# Patient Record
Sex: MALE | Race: WHITE | HISPANIC OR LATINO | Employment: FULL TIME | URBAN - METROPOLITAN AREA
[De-identification: names, ages, dates, MRNs, and addresses within clinical notes are randomized per-mention and may not be internally consistent; named-entity substitution may affect disease eponyms.]

---

## 2020-02-07 ENCOUNTER — TRANSFERRED RECORDS (OUTPATIENT)
Dept: HEALTH INFORMATION MANAGEMENT | Facility: CLINIC | Age: 45
End: 2020-02-07

## 2020-03-10 ENCOUNTER — TRANSFERRED RECORDS (OUTPATIENT)
Dept: HEALTH INFORMATION MANAGEMENT | Facility: CLINIC | Age: 45
End: 2020-03-10

## 2020-06-08 ENCOUNTER — TRANSFERRED RECORDS (OUTPATIENT)
Dept: HEALTH INFORMATION MANAGEMENT | Facility: CLINIC | Age: 45
End: 2020-06-08

## 2020-06-08 LAB
ALT SERPL-CCNC: 19 U/L (ref 9–46)
AST SERPL-CCNC: 18 U/L (ref 10–40)

## 2020-06-09 ENCOUNTER — TRANSFERRED RECORDS (OUTPATIENT)
Dept: INFECTIOUS DISEASES | Facility: CLINIC | Age: 45
End: 2020-06-09

## 2021-08-05 ENCOUNTER — APPOINTMENT (OUTPATIENT)
Dept: CT IMAGING | Facility: HOSPITAL | Age: 46
End: 2021-08-05
Attending: EMERGENCY MEDICINE
Payer: MEDICAID

## 2021-08-05 ENCOUNTER — HOSPITAL ENCOUNTER (EMERGENCY)
Facility: HOSPITAL | Age: 46
Discharge: HOME | End: 2021-08-06
Attending: EMERGENCY MEDICINE | Admitting: EMERGENCY MEDICINE
Payer: MEDICAID

## 2021-08-05 VITALS
DIASTOLIC BLOOD PRESSURE: 84 MMHG | TEMPERATURE: 98.4 F | HEIGHT: 66 IN | HEART RATE: 57 BPM | BODY MASS INDEX: 24.66 KG/M2 | RESPIRATION RATE: 16 BRPM | SYSTOLIC BLOOD PRESSURE: 128 MMHG | OXYGEN SATURATION: 100 % | WEIGHT: 153.44 LBS

## 2021-08-05 DIAGNOSIS — R11.2 NAUSEA AND VOMITING, INTRACTABILITY OF VOMITING NOT SPECIFIED, UNSPECIFIED VOMITING TYPE: ICD-10-CM

## 2021-08-05 DIAGNOSIS — R10.84 ABDOMINAL PAIN, GENERALIZED: ICD-10-CM

## 2021-08-05 DIAGNOSIS — R19.7 DIARRHEA, UNSPECIFIED TYPE: ICD-10-CM

## 2021-08-05 LAB
ALBUMIN SERPL-MCNC: 3.7 G/DL (ref 3.5–5)
ALP SERPL-CCNC: 63 U/L (ref 45–120)
ALT SERPL W P-5'-P-CCNC: 21 U/L (ref 0–45)
ANION GAP SERPL CALCULATED.3IONS-SCNC: 9 MMOL/L (ref 5–18)
AST SERPL W P-5'-P-CCNC: 18 U/L (ref 0–40)
BASOPHILS # BLD AUTO: 0 10E3/UL (ref 0–0.2)
BASOPHILS NFR BLD AUTO: 0 %
BILIRUB DIRECT SERPL-MCNC: 0.2 MG/DL
BILIRUB SERPL-MCNC: 0.6 MG/DL (ref 0–1)
BUN SERPL-MCNC: 14 MG/DL (ref 8–22)
CALCIUM SERPL-MCNC: 9 MG/DL (ref 8.5–10.5)
CHLORIDE BLD-SCNC: 108 MMOL/L (ref 98–107)
CO2 SERPL-SCNC: 23 MMOL/L (ref 22–31)
CREAT SERPL-MCNC: 0.89 MG/DL (ref 0.7–1.3)
EOSINOPHIL # BLD AUTO: 0.2 10E3/UL (ref 0–0.7)
EOSINOPHIL NFR BLD AUTO: 2 %
ERYTHROCYTE [DISTWIDTH] IN BLOOD BY AUTOMATED COUNT: 13.5 % (ref 10–15)
GFR SERPL CREATININE-BSD FRML MDRD: >90 ML/MIN/1.73M2
GLUCOSE BLD-MCNC: 93 MG/DL (ref 70–125)
HCT VFR BLD AUTO: 48.7 % (ref 40–53)
HGB BLD-MCNC: 16.1 G/DL (ref 13.3–17.7)
IMM GRANULOCYTES # BLD: 0 10E3/UL
IMM GRANULOCYTES NFR BLD: 0 %
LIPASE SERPL-CCNC: 24 U/L (ref 0–52)
LYMPHOCYTES # BLD AUTO: 2.2 10E3/UL (ref 0.8–5.3)
LYMPHOCYTES NFR BLD AUTO: 23 %
MCH RBC QN AUTO: 29.9 PG (ref 26.5–33)
MCHC RBC AUTO-ENTMCNC: 33.1 G/DL (ref 31.5–36.5)
MCV RBC AUTO: 90 FL (ref 78–100)
MONOCYTES # BLD AUTO: 0.8 10E3/UL (ref 0–1.3)
MONOCYTES NFR BLD AUTO: 9 %
NEUTROPHILS # BLD AUTO: 6.2 10E3/UL (ref 1.6–8.3)
NEUTROPHILS NFR BLD AUTO: 66 %
NRBC # BLD AUTO: 0 10E3/UL
NRBC BLD AUTO-RTO: 0 /100
PLATELET # BLD AUTO: 288 10E3/UL (ref 150–450)
POTASSIUM BLD-SCNC: 3.7 MMOL/L (ref 3.5–5)
PROT SERPL-MCNC: 6.9 G/DL (ref 6–8)
RBC # BLD AUTO: 5.39 10E6/UL (ref 4.4–5.9)
SODIUM SERPL-SCNC: 140 MMOL/L (ref 136–145)
WBC # BLD AUTO: 9.5 10E3/UL (ref 4–11)

## 2021-08-05 PROCEDURE — 258N000003 HC RX IP 258 OP 636: Performed by: EMERGENCY MEDICINE

## 2021-08-05 PROCEDURE — 83690 ASSAY OF LIPASE: CPT | Performed by: EMERGENCY MEDICINE

## 2021-08-05 PROCEDURE — C9803 HOPD COVID-19 SPEC COLLECT: HCPCS

## 2021-08-05 PROCEDURE — 96375 TX/PRO/DX INJ NEW DRUG ADDON: CPT

## 2021-08-05 PROCEDURE — 96374 THER/PROPH/DIAG INJ IV PUSH: CPT

## 2021-08-05 PROCEDURE — 85025 COMPLETE CBC W/AUTO DIFF WBC: CPT | Performed by: EMERGENCY MEDICINE

## 2021-08-05 PROCEDURE — C9113 INJ PANTOPRAZOLE SODIUM, VIA: HCPCS | Performed by: EMERGENCY MEDICINE

## 2021-08-05 PROCEDURE — 250N000009 HC RX 250: Performed by: EMERGENCY MEDICINE

## 2021-08-05 PROCEDURE — 93005 ELECTROCARDIOGRAM TRACING: CPT

## 2021-08-05 PROCEDURE — 99285 EMERGENCY DEPT VISIT HI MDM: CPT | Mod: 25

## 2021-08-05 PROCEDURE — 82248 BILIRUBIN DIRECT: CPT | Performed by: EMERGENCY MEDICINE

## 2021-08-05 PROCEDURE — 74176 CT ABD & PELVIS W/O CONTRAST: CPT

## 2021-08-05 PROCEDURE — 96361 HYDRATE IV INFUSION ADD-ON: CPT

## 2021-08-05 PROCEDURE — 36415 COLL VENOUS BLD VENIPUNCTURE: CPT | Performed by: EMERGENCY MEDICINE

## 2021-08-05 PROCEDURE — 250N000011 HC RX IP 250 OP 636: Performed by: EMERGENCY MEDICINE

## 2021-08-05 RX ORDER — ONDANSETRON 2 MG/ML
4 INJECTION INTRAMUSCULAR; INTRAVENOUS EVERY 30 MIN PRN
Status: DISCONTINUED | OUTPATIENT
Start: 2021-08-05 | End: 2021-08-06 | Stop reason: HOSPADM

## 2021-08-05 RX ORDER — LOPERAMIDE HCL 2 MG
2 CAPSULE ORAL 4 TIMES DAILY PRN
Qty: 28 CAPSULE | Refills: 0 | Status: SHIPPED | OUTPATIENT
Start: 2021-08-05 | End: 2021-08-12

## 2021-08-05 RX ORDER — SODIUM CHLORIDE 9 MG/ML
INJECTION, SOLUTION INTRAVENOUS CONTINUOUS
Status: DISCONTINUED | OUTPATIENT
Start: 2021-08-05 | End: 2021-08-05

## 2021-08-05 RX ORDER — ONDANSETRON 4 MG/1
4 TABLET, ORALLY DISINTEGRATING ORAL EVERY 6 HOURS PRN
Qty: 10 TABLET | Refills: 0 | Status: SHIPPED | OUTPATIENT
Start: 2021-08-05 | End: 2021-08-12

## 2021-08-05 RX ADMIN — PANTOPRAZOLE SODIUM 40 MG: 40 INJECTION, POWDER, FOR SOLUTION INTRAVENOUS at 22:09

## 2021-08-05 RX ADMIN — FAMOTIDINE 20 MG: 10 INJECTION, SOLUTION INTRAVENOUS at 22:09

## 2021-08-05 RX ADMIN — SODIUM CHLORIDE 1000 ML: 9 INJECTION, SOLUTION INTRAVENOUS at 22:14

## 2021-08-05 ASSESSMENT — ENCOUNTER SYMPTOMS
VOMITING: 1
ABDOMINAL PAIN: 1
DYSURIA: 0
HEMATURIA: 0
DIARRHEA: 1

## 2021-08-05 ASSESSMENT — MIFFLIN-ST. JEOR: SCORE: 1513.75

## 2021-08-06 NOTE — ED PROVIDER NOTES
"46-year-old gentleman presenting with abdominal pain nausea vomiting and diarrhea after eating food this evening with no ill contacts otherwise. He is signed out to me by previous provider with normal labs pending CT and likely discharge. CT is noncontrasted due to the patient's single kidney status. CT demonstrates some nonspecific findings suggesting inflammatory or infectious process.    Ultimately on reevaluation patient comfortable in appearance and I believe safe for discharge with continued outpatient follow-up for repeat evaluation possible repeat imaging.    I do not suspect this represents ischemic gut or anything normal shows given the bowel appearance and concurrent nausea vomiting and diarrhea.    At the conclusion of the encounter I discussed the results of all of the tests and the disposition. All questions were answered. The patient and or family acknowledged understanding and was involved in the decision making regarding the overall care plan. I discussed the utility, limitations and findings of the exam/interventions/studies done during this visit as well as the list of differential diagnosis and symptoms for which to monitor/return to ED. Verbalizes understanding.     Vitals:    08/05/21 2004 08/05/21 2255 08/05/21 2300   BP: 125/80 128/84    Pulse: 79 57 57   Resp: 16     Temp: 98.4  F (36.9  C)     TempSrc: Oral     SpO2: 97% 100% 100%   Weight: 69.6 kg (153 lb 7 oz)     Height: 1.676 m (5' 6\")         Results for orders placed or performed during the hospital encounter of 08/05/21   CT Abdomen Pelvis w/o Contrast    Impression    IMPRESSION:   1.  Dilated loops of small bowel in the left midabdomen as detailed above. These extend into the left upper pelvis where there is extension into an area of bowel loops that appear to be slightly thickened allowing for the noncontrast study. This could be   due to inflammation or infection. Another consideration would be thickening related to a localized " area of adhesion. Mild mesenteric edema. No free air. Follow-up evaluation with either small bowel follow-through or follow-up CT to assess for resolution   of this process may be beneficial.    2.  Left nephrectomy.    3.  Evidence for old granulomatous disease.     Basic metabolic panel   Result Value Ref Range    Sodium 140 136 - 145 mmol/L    Potassium 3.7 3.5 - 5.0 mmol/L    Chloride 108 (H) 98 - 107 mmol/L    Carbon Dioxide (CO2) 23 22 - 31 mmol/L    Anion Gap 9 5 - 18 mmol/L    Urea Nitrogen 14 8 - 22 mg/dL    Creatinine 0.89 0.70 - 1.30 mg/dL    Calcium 9.0 8.5 - 10.5 mg/dL    Glucose 93 70 - 125 mg/dL    GFR Estimate >90 >60 mL/min/1.73m2   Hepatic function panel   Result Value Ref Range    Bilirubin Total 0.6 0.0 - 1.0 mg/dL    Bilirubin Direct 0.2 <=0.5 mg/dL    Protein Total 6.9 6.0 - 8.0 g/dL    Albumin 3.7 3.5 - 5.0 g/dL    Alkaline Phosphatase 63 45 - 120 U/L    AST 18 0 - 40 U/L    ALT 21 0 - 45 U/L   Result Value Ref Range    Lipase 24 0 - 52 U/L   CBC with platelets and differential   Result Value Ref Range    WBC Count 9.5 4.0 - 11.0 10e3/uL    RBC Count 5.39 4.40 - 5.90 10e6/uL    Hemoglobin 16.1 13.3 - 17.7 g/dL    Hematocrit 48.7 40.0 - 53.0 %    MCV 90 78 - 100 fL    MCH 29.9 26.5 - 33.0 pg    MCHC 33.1 31.5 - 36.5 g/dL    RDW 13.5 10.0 - 15.0 %    Platelet Count 288 150 - 450 10e3/uL    % Neutrophils 66 %    % Lymphocytes 23 %    % Monocytes 9 %    % Eosinophils 2 %    % Basophils 0 %    % Immature Granulocytes 0 %    NRBCs per 100 WBC 0 <1 /100    Absolute Neutrophils 6.2 1.6 - 8.3 10e3/uL    Absolute Lymphocytes 2.2 0.8 - 5.3 10e3/uL    Absolute Monocytes 0.8 0.0 - 1.3 10e3/uL    Absolute Eosinophils 0.2 0.0 - 0.7 10e3/uL    Absolute Basophils 0.0 0.0 - 0.2 10e3/uL    Absolute Immature Granulocytes 0.0 <=0.0 10e3/uL    Absolute NRBCs 0.0 10e3/uL     CT Abdomen Pelvis w/o Contrast    Result Date: 8/5/2021  EXAM: CT ABDOMEN PELVIS W/O CONTRAST LOCATION: [facility]  DATE/TIME: 8/5/2021 11:03 PM INDICATION: Epigastric abdominal pain. COMPARISON: None. TECHNIQUE: CT scan of the abdomen and pelvis was performed without IV contrast. Multiplanar reformats were obtained. Dose reduction techniques were used. CONTRAST: None. FINDINGS: LOWER CHEST: No infiltrates or effusions. Calcified granuloma left lower lobe. HEPATOBILIARY: Normal. PANCREAS: Normal. SPLEEN: Normal-sized spleen. Splenic granulomas. ADRENAL GLANDS: Mild adrenal thickening without discrete nodularity. KIDNEYS/BLADDER: Left nephrectomy. No urinary collecting system dilatation or calculi. Bladder unremarkable. BOWEL: Dilated loops of small bowel throughout the left upper quadrant extending into the left lower quadrant and upper left pelvis where there is transition to what appears to be a thickened area of bowel loops in the left lower quadrant (series 3, image 49). Findings may be due to developing small bowel obstruction related to an inflammatory or infectious etiology versus bowel wall thickening associated with localized adhesion. Mild mesenteric edema. Distal small bowel contains a minimal amount of  gas and fluid. No free air. Appendix unremarkable. Large amount of stool in the colon. LYMPH NODES: No lymphadenopathy. VASCULATURE: Unremarkable. PELVIC ORGANS: Unremarkable. No free fluid. MUSCULOSKELETAL: No significant osseous abnormality.     IMPRESSION: 1.  Dilated loops of small bowel in the left midabdomen as detailed above. These extend into the left upper pelvis where there is extension into an area of bowel loops that appear to be slightly thickened allowing for the noncontrast study. This could be  due to inflammation or infection. Another consideration would be thickening related to a localized area of adhesion. Mild mesenteric edema. No free air. Follow-up evaluation with either small bowel follow-through or follow-up CT to assess for resolution  of this process may be beneficial. 2.  Left nephrectomy. 3.   Evidence for old granulomatous disease.       ICD-10-CM    1. Abdominal pain, generalized  R10.84    2. Nausea and vomiting, intractability of vomiting not specified, unspecified vomiting type  R11.2    3. Diarrhea, unspecified type  R19.7           Marco Antonio Galvan MD  08/05/21 9104

## 2021-08-06 NOTE — ED TRIAGE NOTES
"Pt states \"started having abdominal pain, nausea, vomitting and diarrhea\" after eating. Pt also concerned about having one kidney.  "

## 2021-08-06 NOTE — ED PROVIDER NOTES
EMERGENCY DEPARTMENT ENCOUNTER      NAME: Ramon Quinones  AGE: 46 year old male  YOB: 1975  MRN: [id number]  EVALUATION DATE & TIME: 8/5/2021  9:31 PM    PCP: No primary care provider on file.    ED PROVIDER: Antoine Whitt M.D.      Chief Complaint   Patient presents with     Nausea, Vomiting, & Diarrhea     Abdominal Pain         FINAL IMPRESSION:  1. Abdominal pain, generalized    2. Nausea and vomiting, intractability of vomiting not specified, unspecified vomiting type    3. Diarrhea, unspecified type          ED COURSE & MEDICAL DECISION MAKING:    Pertinent Labs & Imaging studies reviewed. (See chart for details)  46 year old male presents to the Emergency Department for evaluation of diarrhea followed by nausea vomiting and abdominal pain.  Lab assays reassuring.  Physical exam with diffuse tenderness to palpation without peritoneal signs.  No signs of GI bleed.    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  Patient presents with complaint of diarrhea since last night.  He subsequently developed nausea and vomiting with p.o. intolerance.  He also has some diffuse abdominal pain.  Patient reports his symptoms began after eating lentils.  However, no one else who ate with him is ill.  He reports some chills but is not had fever.  He took 1 dose of Tylenol for symptoms last night.      2224 Lipase is negative.  LFTs are unremarkable.      2225 Patient has no respiratory symptoms at this time      2225 Abdominal exam without peritoneal findings but there is diffuse tenderness to palpation.  Obtain CT of abdomen without contrast given history of nephrectomy.      2240 CBC is unremarkable           9:38 PM I met with patient for initial interview and encounter. PPE worn includes N95 mask and goggles.  10:40 PM care transition to Dr. Galvan pending results of CT scan of abdomen  At the conclusion of the encounter I  discussed the results of all of the tests and the disposition. The questions were answered. The patient or family acknowledged understanding and was agreeable with the care plan.        minutes of critical care time     MEDICATIONS GIVEN IN THE EMERGENCY:  Medications   0.9% sodium chloride BOLUS (0 mLs Intravenous Stopped 8/5/21 2356)   famotidine (PEPCID) injection 20 mg (20 mg Intravenous Given 8/5/21 2209)   pantoprazole (PROTONIX) IV push injection 40 mg (40 mg Intravenous Given 8/5/21 2209)       NEW PRESCRIPTIONS STARTED AT TODAY'S ER VISIT  Discharge Medication List as of 8/6/2021 12:01 AM      START taking these medications    Details   loperamide (IMODIUM) 2 MG capsule Take 1 capsule (2 mg) by mouth 4 times daily as needed for diarrhea, Disp-28 capsule, R-0, Local Print      ondansetron (ZOFRAN ODT) 4 MG ODT tab Take 1 tablet (4 mg) by mouth every 6 hours as needed for nausea, Disp-10 tablet, R-0, Local Print                =================================================================    HPI    Patient information was obtained from: Patient     Use of Intrepreter: N/A        Ramon Quinones is a 46 year old male with a pertinent history of s/p nephrectomy (left), who presents to this ED via walk-in for evaluation of nausea, vomiting, diarrhea.    Patient reports onset of persistent diarrhea and diffuse abdominal pain around 12:00 AM last night. He took a does of tylenol with some relief. This morning he had additional onset of subjective fever and vomiting, and states he has not been able to keep down any food or liquids all day.     Patient notes that he ate lentils for dinner last night. States that no one else who ate them is sick.    Patient denies any urinary symptoms, or any other complaints.      REVIEW OF SYSTEMS   Review of Systems   Gastrointestinal: Positive for abdominal pain, diarrhea and vomiting.   Genitourinary: Negative for dysuria and hematuria.   All other systems reviewed and  are negative.       PAST MEDICAL HISTORY:  History reviewed. No pertinent past medical history.    PAST SURGICAL HISTORY:  History reviewed. No pertinent surgical history.        CURRENT MEDICATIONS:    loperamide (IMODIUM) 2 MG capsule  ondansetron (ZOFRAN ODT) 4 MG ODT tab        ALLERGIES:  No Known Allergies    FAMILY HISTORY:  History reviewed. No pertinent family history.    SOCIAL HISTORY:   Social History     Socioeconomic History     Marital status: None     Spouse name: None     Number of children: None     Years of education: None     Highest education level: None   Occupational History     None   Tobacco Use     Smoking status: None   Substance and Sexual Activity     Alcohol use: None     Drug use: None     Sexual activity: None   Other Topics Concern     None   Social History Narrative     None     Social Determinants of Health     Financial Resource Strain:      Difficulty of Paying Living Expenses:    Food Insecurity:      Worried About Running Out of Food in the Last Year:      Ran Out of Food in the Last Year:    Transportation Needs:      Lack of Transportation (Medical):      Lack of Transportation (Non-Medical):    Physical Activity:      Days of Exercise per Week:      Minutes of Exercise per Session:    Stress:      Feeling of Stress :    Social Connections:      Frequency of Communication with Friends and Family:      Frequency of Social Gatherings with Friends and Family:      Attends [employer] Services:      Active Member of Clubs or Organizations:      Attends Club or Organization Meetings:      Marital Status:    Intimate Partner Violence:      Fear of Current or Ex-Partner:      Emotionally Abused:      Physically Abused:      Sexually Abused:        VITALS:  Patient Vitals for the past 24 hrs:   BP Temp Temp src Pulse Resp SpO2 Height Weight   08/05/21 2300 -- -- -- 57 -- 100 % -- --   08/05/21 2255 128/84 -- -- 57 -- 100 % -- --   08/05/21 2004 125/80 98.4  F (36.9  C) Oral 79 16 97 %  "1.676 m (5' 6\") 69.6 kg (153 lb 7 oz)       PHYSICAL EXAM    General: A&O x 3 no apparent distress  HEENT: PERRL, EOMI, moist mucous membranes  Neck: Supple, no rigidity  Cardiovascular: 2+ distal pulses, cap refill less than 2 seconds. RRR No m/r/g  Pulmonary: Chest nontender, symmetrical rise, normal effort, no respiratory distress. Clear to auscultation bilaterally.  Abdomen: Diffuse abdominal tenderness to palpation. No distention. No rebound, guarding, or rigidity.  Extremities: No clubbing, cyanosis, or edema  Musculoskeletal: Gait normal; extremities atraumatic x4  Neuro: Cranial nerves II through XII intact, GCS 15; intact, symmetric strength and sensation x4 extremities  Skin: No rash, jaundice, pallor.  Warm dry and intact. Well healed left nephrectomy scar.  Psych: Normal mood and affect         LAB:  All pertinent labs reviewed and interpreted.  Results for orders placed or performed during the hospital encounter of 08/05/21   CT Abdomen Pelvis w/o Contrast    Impression    IMPRESSION:   1.  Dilated loops of small bowel in the left midabdomen as detailed above. These extend into the left upper pelvis where there is extension into an area of bowel loops that appear to be slightly thickened allowing for the noncontrast study. This could be   due to inflammation or infection. Another consideration would be thickening related to a localized area of adhesion. Mild mesenteric edema. No free air. Follow-up evaluation with either small bowel follow-through or follow-up CT to assess for resolution   of this process may be beneficial.    2.  Left nephrectomy.    3.  Evidence for old granulomatous disease.     Basic metabolic panel   Result Value Ref Range    Sodium 140 136 - 145 mmol/L    Potassium 3.7 3.5 - 5.0 mmol/L    Chloride 108 (H) 98 - 107 mmol/L    Carbon Dioxide (CO2) 23 22 - 31 mmol/L    Anion Gap 9 5 - 18 mmol/L    Urea Nitrogen 14 8 - 22 mg/dL    Creatinine 0.89 0.70 - 1.30 mg/dL    Calcium 9.0 8.5 - " 10.5 mg/dL    Glucose 93 70 - 125 mg/dL    GFR Estimate >90 >60 mL/min/1.73m2   Hepatic function panel   Result Value Ref Range    Bilirubin Total 0.6 0.0 - 1.0 mg/dL    Bilirubin Direct 0.2 <=0.5 mg/dL    Protein Total 6.9 6.0 - 8.0 g/dL    Albumin 3.7 3.5 - 5.0 g/dL    Alkaline Phosphatase 63 45 - 120 U/L    AST 18 0 - 40 U/L    ALT 21 0 - 45 U/L   Result Value Ref Range    Lipase 24 0 - 52 U/L   CBC with platelets and differential   Result Value Ref Range    WBC Count 9.5 4.0 - 11.0 10e3/uL    RBC Count 5.39 4.40 - 5.90 10e6/uL    Hemoglobin 16.1 13.3 - 17.7 g/dL    Hematocrit 48.7 40.0 - 53.0 %    MCV 90 78 - 100 fL    MCH 29.9 26.5 - 33.0 pg    MCHC 33.1 31.5 - 36.5 g/dL    RDW 13.5 10.0 - 15.0 %    Platelet Count 288 150 - 450 10e3/uL    % Neutrophils 66 %    % Lymphocytes 23 %    % Monocytes 9 %    % Eosinophils 2 %    % Basophils 0 %    % Immature Granulocytes 0 %    NRBCs per 100 WBC 0 <1 /100    Absolute Neutrophils 6.2 1.6 - 8.3 10e3/uL    Absolute Lymphocytes 2.2 0.8 - 5.3 10e3/uL    Absolute Monocytes 0.8 0.0 - 1.3 10e3/uL    Absolute Eosinophils 0.2 0.0 - 0.7 10e3/uL    Absolute Basophils 0.0 0.0 - 0.2 10e3/uL    Absolute Immature Granulocytes 0.0 <=0.0 10e3/uL    Absolute NRBCs 0.0 10e3/uL       RADIOLOGY:  Reviewed all pertinent imaging. Please see official radiology report.  CT Abdomen Pelvis w/o Contrast   Final Result   IMPRESSION:    1.  Dilated loops of small bowel in the left midabdomen as detailed above. These extend into the left upper pelvis where there is extension into an area of bowel loops that appear to be slightly thickened allowing for the noncontrast study. This could be    due to inflammation or infection. Another consideration would be thickening related to a localized area of adhesion. Mild mesenteric edema. No free air. Follow-up evaluation with either small bowel follow-through or follow-up CT to assess for resolution    of this process may be beneficial.      2.  Left  nephrectomy.      3.  Evidence for old granulomatous disease.               PROCEDURES:   None      I, Mart Hale, am serving as a scribe to document services personally performed by Dr. Whitt based on my observation and the provider's statements to me. I, Antoine Whitt MD attest that Mart Hale is acting in a scribe capacity, has observed my performance of the services and has documented them in accordance with my direction.  Any spelling or grammatic inconsistencies or inaccuracies are typographical or dictation errors.    Antoine Whitt M.D.  Emergency Medicine  [facility] EMERGENCY DEPARTMENT  [facility] [facility] [address]  [phone]  Dept: [phone]     Antoine Whitt MD  08/06/21 4837

## 2021-08-06 NOTE — DISCHARGE INSTRUCTIONS
1.  Follow-up with your primary care on Monday    2.  If you experience worsening abdominal pain, fever, bloody diarrhea or any new or concerning symptoms, return to the emergency department    3.  Take medication as instructed.

## 2022-07-07 ENCOUNTER — TRANSFERRED RECORDS (OUTPATIENT)
Dept: HEALTH INFORMATION MANAGEMENT | Facility: CLINIC | Age: 47
End: 2022-07-07

## 2022-07-19 ENCOUNTER — MEDICAL CORRESPONDENCE (OUTPATIENT)
Dept: HEALTH INFORMATION MANAGEMENT | Facility: CLINIC | Age: 47
End: 2022-07-19

## 2022-07-23 ENCOUNTER — TRANSCRIBE ORDERS (OUTPATIENT)
Dept: OTHER | Age: 47
End: 2022-07-23

## 2022-07-23 DIAGNOSIS — H35.722 RETINAL PIGMENT EPITHELIAL DETACHMENT OF LEFT EYE: Primary | ICD-10-CM

## 2022-08-10 DIAGNOSIS — H43.393 VITREOUS SYNERESIS OF BOTH EYES: Primary | ICD-10-CM

## 2022-08-30 ENCOUNTER — TELEPHONE (OUTPATIENT)
Dept: OPHTHALMOLOGY | Facility: CLINIC | Age: 47
End: 2022-08-30

## 2022-08-30 NOTE — TELEPHONE ENCOUNTER
[phone]    Spoke to pt at 0915 with     Pt did not receive appt details prior to visit last week and was not able to make appt    Scheduled September 8th with Dr. Tavera    Pt aware of date/time/location/duration and main clinic number    James Rae RN 9:43 AM 08/31/22            [facility] Call Center    Phone Message    May a detailed message be left on voicemail: yes     Reason for Call: Appointment Intake    Referring Provider Name: Referred by: Dr. 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

## 2022-09-08 ENCOUNTER — OFFICE VISIT (OUTPATIENT)
Dept: OPHTHALMOLOGY | Facility: CLINIC | Age: 47
End: 2022-09-08
Attending: OPHTHALMOLOGY
Payer: MEDICAID

## 2022-09-08 ENCOUNTER — LAB (OUTPATIENT)
Dept: LAB | Facility: CLINIC | Age: 47
End: 2022-09-08
Payer: MEDICAID

## 2022-09-08 DIAGNOSIS — H35.712 CENTRAL SEROUS CHORIORETINOPATHY OF LEFT EYE: Primary | ICD-10-CM

## 2022-09-08 DIAGNOSIS — H35.063 RETINAL VASCULITIS OF BOTH EYES: ICD-10-CM

## 2022-09-08 DIAGNOSIS — H43.393 VITREOUS SYNERESIS OF BOTH EYES: ICD-10-CM

## 2022-09-08 PROCEDURE — 99207 FUNDUS PHOTOS OU (BOTH EYES): CPT | Mod: 26 | Performed by: STUDENT IN AN ORGANIZED HEALTH CARE EDUCATION/TRAINING PROGRAM

## 2022-09-08 PROCEDURE — 86225 DNA ANTIBODY NATIVE: CPT | Performed by: STUDENT IN AN ORGANIZED HEALTH CARE EDUCATION/TRAINING PROGRAM

## 2022-09-08 PROCEDURE — 86038 ANTINUCLEAR ANTIBODIES: CPT | Performed by: STUDENT IN AN ORGANIZED HEALTH CARE EDUCATION/TRAINING PROGRAM

## 2022-09-08 PROCEDURE — 99204 OFFICE O/P NEW MOD 45 MIN: CPT | Mod: GC | Performed by: STUDENT IN AN ORGANIZED HEALTH CARE EDUCATION/TRAINING PROGRAM

## 2022-09-08 PROCEDURE — 86160 COMPLEMENT ANTIGEN: CPT | Performed by: STUDENT IN AN ORGANIZED HEALTH CARE EDUCATION/TRAINING PROGRAM

## 2022-09-08 PROCEDURE — 99000 SPECIMEN HANDLING OFFICE-LAB: CPT | Performed by: PATHOLOGY

## 2022-09-08 PROCEDURE — 85549 MURAMIDASE: CPT | Mod: 90 | Performed by: PATHOLOGY

## 2022-09-08 PROCEDURE — 36415 COLL VENOUS BLD VENIPUNCTURE: CPT | Performed by: PATHOLOGY

## 2022-09-08 PROCEDURE — 82164 ANGIOTENSIN I ENZYME TEST: CPT | Mod: 90 | Performed by: PATHOLOGY

## 2022-09-08 PROCEDURE — 86618 LYME DISEASE ANTIBODY: CPT | Performed by: STUDENT IN AN ORGANIZED HEALTH CARE EDUCATION/TRAINING PROGRAM

## 2022-09-08 PROCEDURE — 92134 CPTRZ OPH DX IMG PST SGM RTA: CPT | Mod: 26 | Performed by: STUDENT IN AN ORGANIZED HEALTH CARE EDUCATION/TRAINING PROGRAM

## 2022-09-08 PROCEDURE — 92134 CPTRZ OPH DX IMG PST SGM RTA: CPT | Performed by: OPHTHALMOLOGY

## 2022-09-08 PROCEDURE — 92250 FUNDUS PHOTOGRAPHY W/I&R: CPT | Performed by: OPHTHALMOLOGY

## 2022-09-08 PROCEDURE — 86036 ANCA SCREEN EACH ANTIBODY: CPT | Performed by: STUDENT IN AN ORGANIZED HEALTH CARE EDUCATION/TRAINING PROGRAM

## 2022-09-08 PROCEDURE — G0463 HOSPITAL OUTPT CLINIC VISIT: HCPCS | Mod: 25

## 2022-09-08 PROCEDURE — 86481 TB AG RESPONSE T-CELL SUSP: CPT | Performed by: STUDENT IN AN ORGANIZED HEALTH CARE EDUCATION/TRAINING PROGRAM

## 2022-09-08 PROCEDURE — 92235 FLUORESCEIN ANGRPH MLTIFRAME: CPT | Performed by: OPHTHALMOLOGY

## 2022-09-08 PROCEDURE — 92235 FLUORESCEIN ANGRPH MLTIFRAME: CPT | Mod: 26 | Performed by: STUDENT IN AN ORGANIZED HEALTH CARE EDUCATION/TRAINING PROGRAM

## 2022-09-08 PROCEDURE — 86780 TREPONEMA PALLIDUM: CPT | Performed by: STUDENT IN AN ORGANIZED HEALTH CARE EDUCATION/TRAINING PROGRAM

## 2022-09-08 RX ORDER — ESCITALOPRAM OXALATE 10 MG/1
10 TABLET ORAL
COMMUNITY

## 2022-09-08 RX ORDER — IBUPROFEN 200 MG
400 TABLET ORAL
COMMUNITY
Start: 2022-08-05

## 2022-09-08 RX ORDER — HYDROCHLOROTHIAZIDE 25 MG/1
12.5 TABLET ORAL
COMMUNITY

## 2022-09-08 RX ORDER — ACETAMINOPHEN 325 MG/1
650 TABLET ORAL
COMMUNITY
Start: 2022-08-05

## 2022-09-08 ASSESSMENT — TONOMETRY
OD_IOP_MMHG: 14
OS_IOP_MMHG: 15
IOP_METHOD: TONOPEN

## 2022-09-08 ASSESSMENT — CONF VISUAL FIELD
OS_NORMAL: 1
OD_NORMAL: 1
METHOD: COUNTING FINGERS

## 2022-09-08 ASSESSMENT — VISUAL ACUITY
OS_CC: 20/20
OD_CC: 20/20
OS_CC+: -2
CORRECTION_TYPE: GLASSES
METHOD: SNELLEN - LINEAR

## 2022-09-08 ASSESSMENT — CUP TO DISC RATIO
OD_RATIO: 0.2
OS_RATIO: 0.3

## 2022-09-08 ASSESSMENT — REFRACTION_WEARINGRX
SPECS_TYPE: BIFOCAL
OS_CYLINDER: +0.75
OD_CYLINDER: +0.50
OS_ADD: +1.50
OS_SPHERE: -0.25
OD_SPHERE: -0.25
OD_AXIS: 006
OS_AXIS: 011
OD_ADD: +1.50

## 2022-09-08 ASSESSMENT — EXTERNAL EXAM - LEFT EYE: OS_EXAM: NORMAL

## 2022-09-08 ASSESSMENT — SLIT LAMP EXAM - LIDS
COMMENTS: NORMAL
COMMENTS: NORMAL

## 2022-09-08 ASSESSMENT — EXTERNAL EXAM - RIGHT EYE: OD_EXAM: NORMAL
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
## 2022-09-08 NOTE — NURSING NOTE
Chief Complaints and History of Present Illnesses   Patient presents with     Retinal Evaluation     Chief Complaint(s) and History of Present Illness(es)     Retinal Evaluation     Laterality: left eye    Course: stable    Associated symptoms: headache.  Negative for eye pain, flashes and floaters              Comments     48yo male here for RPE detachment left eye. Vision is doing well - he notes an circular object in vision when he blinks. There is occasional HA. No flashes and floaters. No gtts.    Mingo Stallworth COT 11:38 AM September 8, 2022

## 2022-09-08 NOTE — PATIENT INSTRUCTIONS
Por favor, shira pruebas de blanca a la [facility]: [address]; [place], [place]     Regrese con la especialista en inflammacion oculares (Dr Chappell o Dra Hernandez) y Dra Tavera in 8 semanas

## 2022-09-08 NOTE — PROGRESS NOTES
CC: First appointment with me. Referred by outside optometrist [facility]. Patient doesn't remember name.     HPI: Ramon Quinones is a pleasant 47 year old male patient with unremarkable PMHx and POHx who developed a blurred spot and distortion in his left eye sometime in 03/2022. He went to [facility] as he thought he needed new glasses, and they took a fundus photo showing him an abnormality in his left eye.  He thinks the blurred spot and distortion have stayed the same since 03/2022.     He has been stressed over the last 2 weeks with relationship problems, but denies any unusual life stressors.  Had cholesteatoma left ear resected by ENT 08/05/2022.   He donated one of his kidneys.     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 in late phase, peripheral temporal vascular leakage along one branch of IT arteriole and mild peripheral capillary dropout; few microaneurysms   LE: arteriole phase hyperF at the 2 macular PED lesions - 2x lesions exhibit late hyperfluorescent pooling; no late staining or leakage of the disc, in late phase, peripheral temporal vascular leakage along one branch of IT arteriole and  mild peripheral capillary dropout;  few microaneurysms     Assessment & Plan:    # Retinal vasculitis, mild inflammatory stage, both eyes  - Incidental finding with subtle IRHs on clinical exam, but confirmed with FA  - Mild vascular leakage temporally on FA in the IT arteriolar distributions correlating with IRH on clinical exam. No macular involvement. No NV.    - ROS negative besides left hearing loss after choleasteatoma resection and paracentral scotoma left eye due to CSCR  - DDx: infectious, inflammatory conditions like syphillis, sarcoid or SLE;   - ROS is not consistent with GPA, MPA or PAN, with good kidney function doubt anti-GBM.   - Will workup with YVAN, anti-dsDNA, ANCA, ACE, lysozyme, QuantGold, syphillis, C3, C4, Lyme  - Will refer to Uveitis for further monitoring and management     # CSCR, left eye  - Onset by history 03/2022 and has not improved by subjective reports  - Etiology: Viagra?, possible YOVANI?, not a hyperope, only had recent stressor but he was doing well back in 03/2022 w/o any life stressors  - unclear if related to peripheral retinal vasculitis  - denies ever using corticosteroid-containing medications   - VA 20/20 with only mild symptoms  - Will observe and repeat imaging in 8 weeks    # Mild HTN retinopathy   - Mild AV crossing changes in both eyes  - Denies DM  - BP in clinic 139/93   - Emphasized good BP control with [facility]/PCP team     # K scar, left eye  - Likely old metallic FB, but pt doesn't remember  - Observe      RTC: Return in about 4 weeks (around 10/6/2022) for Dr Chappell or David with labs, DFE and OCT mac.   Retina Dr Tavera in 8 weeks         ~~~~~~~~~~~~~~~~~~~~~~~~~~~~~~~~~~  My privilege to be part of your care,  Adonis Gil MD, MSc  Ophthalmology PGY-3 resident physician  Pager: [phone]     Complete documentation of historical and exam elements from today's encounter can be found in the full encounter summary report (not reduplicated in this progress note).  I personally obtained the chief complaint(s) and history of present illness.  I confirmed and edited as necessary the review of systems, past medical/surgical history, family history, social history, and examination findings as documented by others; and I examined the patient myself.  I personally reviewed the relevant tests, images, and reports as  documented above.  I personally reviewed the ophthalmic test(s) associated with this encounter, agree with the interpretation(s) as documented by the resident/fellow, and have edited the corresponding report(s) as necessary.   I formulated and edited as necessary the assessment and plan and discussed the findings and management plan with the patient and family      Estrellita Tavera MD  Professor of Ophthalmology.   Vitreo-retinal surgeon   Department of Ophthalmology and Visual Neurosciences   [facility]  Phone: [phone]   Fax: [phone]

## 2022-09-09 LAB
ACE SERPL-CCNC: 25 U/L
ANA SER QL IF: NEGATIVE
ANCA AB PATTERN SER IF-IMP: NORMAL
B BURGDOR IGG+IGM SER QL: 0.08
C-ANCA TITR SER IF: NORMAL {TITER}
C3 SERPL-MCNC: 88 MG/DL (ref 81–157)
C4 SERPL-MCNC: 19 MG/DL (ref 13–39)
DSDNA AB SER-ACNC: <0.6 IU/ML
T PALLIDUM AB SER QL: NONREACTIVE

## 2022-09-10 LAB
GAMMA INTERFERON BACKGROUND BLD IA-ACNC: 0.21 IU/ML
M TB IFN-G BLD-IMP: POSITIVE
M TB IFN-G CD4+ BCKGRND COR BLD-ACNC: 9.79 IU/ML
MITOGEN IGNF BCKGRD COR BLD-ACNC: 0.68 IU/ML
MITOGEN IGNF BCKGRD COR BLD-ACNC: 1.12 IU/ML
QUANTIFERON MITOGEN: 10 IU/ML
QUANTIFERON NIL TUBE: 0.21 IU/ML
QUANTIFERON TB1 TUBE: 0.89 IU/ML
QUANTIFERON TB2 TUBE: 1.33

## 2022-09-11 ENCOUNTER — TELEPHONE (OUTPATIENT)
Dept: OPHTHALMOLOGY | Facility: CLINIC | Age: 47
End: 2022-09-11

## 2022-09-11 DIAGNOSIS — H35.063 RETINAL VASCULITIS OF BOTH EYES: Primary | ICD-10-CM

## 2022-09-11 DIAGNOSIS — Z22.7 LATENT TUBERCULOSIS: ICD-10-CM

## 2022-09-11 NOTE — TELEPHONE ENCOUNTER
Reached out to patient informing him about Quantiferon Tb. I placed referral for infectious disease to consider treatment of latent Tb.

## 2022-09-12 LAB — LYSOZYME SERPL-MCNC: 2.25 UG/ML

## 2022-09-13 ENCOUNTER — TELEPHONE (OUTPATIENT)
Dept: INFECTIOUS DISEASES | Facility: CLINIC | Age: 47
End: 2022-09-13

## 2022-09-13 ENCOUNTER — PATIENT OUTREACH (OUTPATIENT)
Dept: CARE COORDINATION | Facility: CLINIC | Age: 47
End: 2022-09-13

## 2022-09-13 NOTE — TELEPHONE ENCOUNTER
----- Message from Madeleine Johnston RN sent at 9/13/2022 11:58 AM CDT -----  Hi!    We have an opening tomorrow (Sarah) and an opening Thursday (Ruthie). Would you mind calling patient and seeing if he wants a sooner appointment?    Thanks!!  Karo

## 2022-09-14 NOTE — TELEPHONE ENCOUNTER
Patient calling back and wants to speak to a nurse regarding an appointment.  Please advise. Thank you.
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
## 2022-09-14 NOTE — TELEPHONE ENCOUNTER
Patient returning call to schedule sooner appointment on 9/15/2022  needed more info to set appointment. What time? Please follow up . Thank you

## 2022-09-22 ENCOUNTER — OFFICE VISIT (OUTPATIENT)
Dept: CT IMAGING | Facility: CLINIC | Age: 47
End: 2022-09-22
Attending: STUDENT IN AN ORGANIZED HEALTH CARE EDUCATION/TRAINING PROGRAM
Payer: MEDICAID

## 2022-09-22 VITALS
BODY MASS INDEX: 24.86 KG/M2 | OXYGEN SATURATION: 98 % | WEIGHT: 154 LBS | DIASTOLIC BLOOD PRESSURE: 110 MMHG | HEART RATE: 64 BPM | SYSTOLIC BLOOD PRESSURE: 148 MMHG | TEMPERATURE: 97.6 F

## 2022-09-22 DIAGNOSIS — R76.12 POSITIVE QUANTIFERON-TB GOLD TEST: ICD-10-CM

## 2022-09-22 PROCEDURE — G0463 HOSPITAL OUTPT CLINIC VISIT: HCPCS

## 2022-09-22 PROCEDURE — 99205 OFFICE O/P NEW HI 60 MIN: CPT | Performed by: STUDENT IN AN ORGANIZED HEALTH CARE EDUCATION/TRAINING PROGRAM

## 2022-09-22 ASSESSMENT — PAIN SCALES - GENERAL: PAINLEVEL: NO PAIN (0)

## 2022-09-22 NOTE — PROGRESS NOTES
[facility]  General Infectious Disease Clinic Note:  New Patient     Patient:  Ramon Chaidez, Date of birth 1975   Medical record number [id number]  Date of Visit:  09/22/2022  Consult requested by Ophthalmology for evaluation of positive Quantiferon test.            Problem List:  1. Positive Quantiferon test     Assessment:  Mr. 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     - During today's encounter, patient informed the writer that he was treated for LTBI by his Primary care doctor just before or at the beginning of COVID-19 pandemic. He does not recall details but agreeing to sign release of records to verify this.     - His c/o vision changes started 6 months ago. If patient was treated adequately for LTBI in past, then it likely is not a attributing factor for his current retinal vasculitis. Work up for other etiology such as granulomatous disease will be useful. Prior CT in Aug, '21 mentioned about changes consistent with old granulomatous disease.     - Although if in case, not able to verify prior LTBI treatment then it might be challenging, since need to rule out active Tb disease before starting treatment for LTBI. In case of involvement of eye, which can presents as panuveitis and difficult to diagnose active Tb due to paucity of organism and may need to treat as Tb meningitis. As of now, will hold of any such evaluation and will attempt to verify prior LTBI treatment records.     Recommendations:  1. Plan to obtain records from primary care clinic and verify prior treatment for LTBI. In that case, patient will not need treatment for positive Quantiferon test that was resulted on 9/8/22. Of note, Quantiferon test can remain positive for life, even after successful  treatment for LTBI.       60 minutes spent on the date of the encounter doing chart review, history and exam, documentation and further activities per the note.     Chelita Avelar MD    Infectious Diseases  Pager: [phone]         History of the Infectious Disease lllness:     Mr. JANIYA CHAIDEZ is a 48 yo Puerto Rican-speaking male, accompanied by his wife for today's visit.  service was used for this encounter.     Patient is referred to ID clinic by Ophthalmology regarding positive Quantiferon test resulted on 9/8/22. Patient started to have vision changes started at least 6 months ago in March, '22. He was seen by Ophthalmology clinic, and noted to have b/l mild retinal vasculitis which can be either infectious or inflammatory in etiology. As a work up, found to have positive Quantiferon and concern for LTBI which likely causing eye symptoms. During interview, patient recalls taking a pill for few months when his urine turned red. This was just before or around the beginning of COVID-19 pandemic. Does not recall details but states that his primary care doctor can provide the records. Denies fever, chills, night sweats, weight loss, cough, short of breath. No new changes in his vision and will be following up with his eye doctor as scheduled. He was tested negative for HIV per patient, ~5 years when tested at some clinic site in [facility] (at [address]       Review of Systems:  CONSTITUTIONAL:  No fevers or chills. No night sweats.  EYES: negative for icterus or acute vision changes.   ENT:  negative for hearing loss, tinnitus or sore throat  RESPIRATORY:  negative for cough, sputum, dyspnea  CARDIOVASCULAR:  negative for chest pain, heart palpitations  GASTROINTESTINAL:  negative for nausea, vomiting, diarrhea or constipation  GENITOURINARY:  negative for dysuria or hematuria.  HEME:  No easy bruising or bleeding  INTEGUMENT:  negative for rash or pruritus  NEURO:  Negative for  headache or tremor.    No past medical history on file.    Past Surgical History:   Procedure Laterality Date     INNER EAR SURGERY Left      KIDNEY SURGERY         Family History   Problem Relation Age of Onset     No Known Problems Mother      No Known Problems Father      No Known Problems Maternal Grandmother      No Known Problems Maternal Grandfather      No Known Problems Paternal Grandmother      No Known Problems Paternal Grandfather      No Known Problems Brother      No Known Problems Sister      No Known Problems Son      No Known Problems Daughter      No Known Problems Other      No Known Problems Other        Social History     Social History Narrative     Not on file     Social History     Tobacco Use     Smoking status: Current Every Day Smoker     Types: Cigarettes     Smokeless tobacco: Never Used         There is no immunization history on file for this patient.    There is no problem list on file for this patient.      Outpatient Medications Marked as Taking for the 9/22/22 encounter (Office Visit) with Chelita Avelar MD   Medication Sig     acetaminophen (TYLENOL) 325 MG tablet Take 650 mg by mouth     escitalopram (LEXAPRO) 10 MG tablet Take 10 mg by mouth     hydrochlorothiazide (HYDRODIURIL) 25 MG tablet Take 12.5 mg by mouth     ibuprofen (ADVIL/MOTRIN) 200 MG tablet Take 400 mg by mouth       No Known Allergies           Physical Exam:   Vitals were reviewed.  All vitals stable  BP (!) 148/110   Pulse 64   Temp 97.6  F (36.4  C)   Wt 69.9 kg (154 lb)   SpO2 98%   BMI 24.86 kg/m    Wt Readings from Last 4 Encounters:   09/22/22 69.9 kg (154 lb)   08/05/21 69.6 kg (153 lb 7 oz)       Exam:  GENERAL: well-developed, well-nourished, alert, oriented, in no acute distress.  HEAD: Head is normocephalic, atraumatic   EYES: Eyes have anicteric sclerae.    ENT: Oropharynx is moist without exudates or ulcers.  NECK: Supple.  LUNGS: Clear to auscultation.  CARDIOVASCULAR: Regular rate and  rhythm with no murmurs, gallops or rubs.  ABDOMEN: Normal bowel sounds, soft, nontender.  SKIN: No acute rashes. Line is in place without any surrounding erythema.  NEUROLOGIC: Grossly nonfocal.         Laboratory Data:     Metabolic Studies    Recent Labs   Lab Test 08/05/21 2146      POTASSIUM 3.7   CHLORIDE 108*   CO2 23   ANIONGAP 9   BUN 14   CR 0.89   GFRESTIMATED >90   GLC 93   RAFAEL 9.0       Hepatic Studies    Recent Labs   Lab Test 08/05/21 2146   BILITOTAL 0.6   DBIL 0.2   ALKPHOS 63   PROTTOTAL 6.9   ALBUMIN 3.7   AST 18   ALT 21       Hematology Studies     Recent Labs   Lab Test 08/05/21 2146   WBC 9.5   HGB 16.1   HCT 48.7          Pancreatitis testing    Recent Labs   Lab Test 08/05/21 2146   LIPASE 24       Autoimmune Testing     Recent Labs   Lab Test 09/08/22  1520   DNA <0.6       Urine Studies   No lab results found.    Medication levels  No lab results found.    Invalid input(s): AMIK    CSF testing   No lab results found.    Invalid input(s): CADAM, EVPCR, ENTPCR, ENTEROVIRUS    Microbiology:  No results found for: CULTURE    Last check of C difficile  No results found for: CDBPCT    Beta D Glucan levels (Fungitell assay)    No components found for: FUNGTL     Syphilis Testing  Invalid input(s): JUF1062    Tick Testing  No lab results found.    Invalid input(s): APHAGM    Quantiferon testing   Recent Labs   Lab Test 09/08/22  1520   TBRES Positive*       Infection Studies to assess Diarrhea No lab results found.    Virology:  Respiratory virus testing    No lab results found.    Parvovirus Testing  No lab results found.    Invalid input(s): PRVRES    Adenovirus Testing  No lab results found.    Invalid input(s): ADENAB, ADENOVIRUS, ADQT    Hepatitis B Testing   No lab results found.    Was the last Hepatitis B E antigen positive?   No results found for: HBEAGN   No results found for: HCVAB, HQTG, HCGENO, HCPCR, HQTRNA, HEPRNA, CRYOG    No results found for: CMVIGG, CMVM, CMVIM,  CMIG, CMVG, CMIGG, CMIM, CMVIGM, CMLTX, HSVG1, HSVG2, HSVTP1, JI0801, HS12M, HS12GR, HS1GR, HS2GR, HSIM, HSIG, HSIGR, HSVIGMAB, HSVG1, VZVIGG, VARICZOSAB  No results found for: EBVCAG, EBIG2, EBIGM, EBVIGG, EBIGG, EBVAGN, DD1267, TOXG  No results found for: H1IGG, H2IGG, EBVCAM    No components found for: [id number]    Pathology:  No results found for this or any previous visit (from the past 8760 hour(s)).     Imaging:  Results for orders placed or performed during the hospital encounter of 08/05/21   CT Abdomen Pelvis w/o Contrast    Narrative    EXAM: CT ABDOMEN PELVIS W/O CONTRAST  LOCATION: [facility]  DATE/TIME: 8/5/2021 11:03 PM    INDICATION: Epigastric abdominal pain.    COMPARISON: None.    TECHNIQUE: CT scan of the abdomen and pelvis was performed without IV contrast. Multiplanar reformats were obtained. Dose reduction techniques were used.    CONTRAST: None.    FINDINGS:   LOWER CHEST: No infiltrates or effusions. Calcified granuloma left lower lobe.    HEPATOBILIARY: Normal.    PANCREAS: Normal.    SPLEEN: Normal-sized spleen. Splenic granulomas.    ADRENAL GLANDS: Mild adrenal thickening without discrete nodularity.    KIDNEYS/BLADDER: Left nephrectomy. No urinary collecting system dilatation or calculi. Bladder unremarkable.    BOWEL: Dilated loops of small bowel throughout the left upper quadrant extending into the left lower quadrant and upper left pelvis where there is transition to what appears to be a thickened area of bowel loops in the left lower quadrant (series 3,   image 49). Findings may be due to developing small bowel obstruction related to an inflammatory or infectious etiology versus bowel wall thickening associated with localized adhesion. Mild mesenteric edema. Distal small bowel contains a minimal amount of   gas and fluid. No free air. Appendix unremarkable. Large amount of stool in the colon.    LYMPH NODES: No lymphadenopathy.    VASCULATURE:  Unremarkable.    PELVIC ORGANS: Unremarkable. No free fluid.    MUSCULOSKELETAL: No significant osseous abnormality.      Impression    IMPRESSION:   1.  Dilated loops of small bowel in the left midabdomen as detailed above. These extend into the left upper pelvis where there is extension into an area of bowel loops that appear to be slightly thickened allowing for the noncontrast study. This could be   due to inflammation or infection. Another consideration would be thickening related to a localized area of adhesion. Mild mesenteric edema. No free air. Follow-up evaluation with either small bowel follow-through or follow-up CT to assess for resolution   of this process may be beneficial.    2.  Left nephrectomy.    3.  Evidence for old granulomatous disease.

## 2022-09-22 NOTE — NURSING NOTE
Chief Complaint   Patient presents with     Consult     New vasculitis of both eyes, latent tb     Blood pressure (!) 148/110, pulse 64, temperature 97.6  F (36.4  C), weight 69.9 kg (154 lb), SpO2 98 %.    Shellie Myers

## 2022-09-24 PROBLEM — R76.12 POSITIVE QUANTIFERON-TB GOLD TEST: Status: ACTIVE | Noted: 2022-09-24

## 2022-10-03 ENCOUNTER — HEALTH MAINTENANCE LETTER (OUTPATIENT)
Age: 47
End: 2022-10-03

## 2022-10-12 ENCOUNTER — TRANSFERRED RECORDS (OUTPATIENT)
Dept: HEALTH INFORMATION MANAGEMENT | Facility: CLINIC | Age: 47
End: 2022-10-12

## 2022-10-13 ENCOUNTER — TELEPHONE (OUTPATIENT)
Dept: INFECTIOUS DISEASES | Facility: CLINIC | Age: 47
End: 2022-10-13

## 2022-10-13 NOTE — TELEPHONE ENCOUNTER
Received records from [facility] patient was started on treatment for latent TB 2/7/20 (start date) x4 months with monthly labs (AST, ALT)   RN to call clinic to inquire about follow through June to complete series of Rifampin 600 mg.     Per OV with [facility] patient completed Full course treatment for Latent TB Rifampin 600 mg daily starting 2/7/2020-6/7/2020.     Will send records to HIM.     Josué Monet RN  Infectious Disease 9:15 AM 10/13/22

## 2022-10-13 NOTE — LETTER
RE:  RAMOS:  Address: Ramon Quinones  1975  [address] [place] [address]     CERTIFICATE OF COMPLETION FOR LATENT TB TREATMENT    Ramon Quinones was seen and cared for at [facility] by Dr. Chelita Avelar 22 . It has been verified and confirmed patient has been diagnosed and treated for LTBI via [facility].     Ramon was screened positive for TB:   Date Test   1 20 Quantiferon test - will remain positive for life due to past LTBI          Active TB was ruled out due to:   Date Test   1 20 Negative CXR [facility]     Medications Dosage Date Started Date Completed   Rifampin 600 mg daily  2020     Ramon has successfully completed 4 months of treatment for Latent TB Infection  [facility].     Patient will always have positive result to TB testing therefor this certificate is needed to provide [employer], [employer] Systems, or Employers proof of patients complete treatment of latent TB.    KIM KAUR RN Date    10/14/2022

## 2022-10-13 NOTE — TELEPHONE ENCOUNTER
----- Message from Danya Montiel RN sent at 10/7/2022  3:12 PM CDT -----  Regarding: FW: Prior records of LTBI treatment  Hi Josué, and Karo,  This is a pt who saw Dr Avelar for positive Quant gold. Pt reports he was treated already by [facility].    I called [facility] [phone] and they said this was a pt of theirs, and they were going to have a nurse call me back to possibly give me the dates the pt took his LTBI medicaiton through them, however they never called me back.     So I called them again today.  I told them im here until 5 today, if they can call, otherwise to call Josué next week (they are closed Monday) gave them Josué's number.    Just asking for the dates pt started the meds, and stopped the meds and what the names of the meds were he took for LTBI.    [facility] reports they have no person with this name.  [facility]    Dr Avelar is who is asking for this information because she needs to know if pt needs to be treated again.    I also promised to mail the pt his LTBI completion letter if we can get this information so he has something to show anyone who asks him why his quant is positive, to show that he was treated.  (I am happy to do this when I get back, if we can save this pt note to remind me!!)    Danya PATE RN   ----- Message -----  From: Chelita Avelar MD  Sent: 10/3/2022   1:09 PM CDT  To: [facility]  Subject: Prior records of LTBI treatment                  Hello,   I just want to reach out and see if our clinic was able to obtain records from PCP office regarding LTBI treatment records. He has signed the release on the office visit 9/22/2022. Let me know. Thank you!

## 2022-10-14 NOTE — TELEPHONE ENCOUNTER
Letter completed and sent to patient via Mychart and mail.       Josué Monet RN  Infectious Disease 10:25 AM 10/14/22

## 2022-10-25 DIAGNOSIS — H35.063 RETINAL VASCULITIS OF BOTH EYES: Primary | ICD-10-CM

## 2023-10-21 ENCOUNTER — HEALTH MAINTENANCE LETTER (OUTPATIENT)
Age: 48
End: 2023-10-21

## 2024-12-14 ENCOUNTER — HEALTH MAINTENANCE LETTER (OUTPATIENT)
Age: 49
End: 2024-12-14